# Patient Record
Sex: MALE | NOT HISPANIC OR LATINO | Employment: UNEMPLOYED | URBAN - METROPOLITAN AREA
[De-identification: names, ages, dates, MRNs, and addresses within clinical notes are randomized per-mention and may not be internally consistent; named-entity substitution may affect disease eponyms.]

---

## 2022-09-26 ENCOUNTER — APPOINTMENT (OUTPATIENT)
Dept: CT IMAGING | Facility: HOSPITAL | Age: 22
End: 2022-09-26
Attending: EMERGENCY MEDICINE

## 2022-09-26 ENCOUNTER — HOSPITAL ENCOUNTER (EMERGENCY)
Facility: HOSPITAL | Age: 22
Discharge: HOME | End: 2022-09-26
Attending: STUDENT IN AN ORGANIZED HEALTH CARE EDUCATION/TRAINING PROGRAM | Admitting: STUDENT IN AN ORGANIZED HEALTH CARE EDUCATION/TRAINING PROGRAM

## 2022-09-26 VITALS
HEART RATE: 109 BPM | RESPIRATION RATE: 18 BRPM | SYSTOLIC BLOOD PRESSURE: 136 MMHG | TEMPERATURE: 98.6 F | OXYGEN SATURATION: 97 % | WEIGHT: 160 LBS | DIASTOLIC BLOOD PRESSURE: 77 MMHG

## 2022-09-26 DIAGNOSIS — J02.0 STREPTOCOCCAL PHARYNGITIS: ICD-10-CM

## 2022-09-26 LAB
ANION GAP SERPL CALCULATED.3IONS-SCNC: 14 MMOL/L (ref 7–15)
BUN SERPL-MCNC: 11.2 MG/DL (ref 6–20)
CALCIUM SERPL-MCNC: 9.6 MG/DL (ref 8.6–10)
CHLORIDE SERPL-SCNC: 99 MMOL/L (ref 98–107)
CREAT SERPL-MCNC: 0.59 MG/DL (ref 0.67–1.17)
DEPRECATED HCO3 PLAS-SCNC: 24 MMOL/L (ref 22–29)
DEPRECATED S PYO AG THROAT QL EIA: POSITIVE
ERYTHROCYTE [DISTWIDTH] IN BLOOD BY AUTOMATED COUNT: 12.7 % (ref 10–15)
FLUAV RNA SPEC QL NAA+PROBE: NEGATIVE
FLUBV RNA RESP QL NAA+PROBE: NEGATIVE
GFR SERPL CREATININE-BSD FRML MDRD: >90 ML/MIN/1.73M2
GLUCOSE SERPL-MCNC: 127 MG/DL (ref 70–99)
HCT VFR BLD AUTO: 52.6 % (ref 40–53)
HGB BLD-MCNC: 17.1 G/DL (ref 13.3–17.7)
MCH RBC QN AUTO: 25.8 PG (ref 26.5–33)
MCHC RBC AUTO-ENTMCNC: 32.5 G/DL (ref 31.5–36.5)
MCV RBC AUTO: 79 FL (ref 78–100)
MONOCYTES NFR BLD AUTO: NEGATIVE %
PLATELET # BLD AUTO: 200 10E3/UL (ref 150–450)
POTASSIUM SERPL-SCNC: 4.4 MMOL/L (ref 3.4–5.3)
RBC # BLD AUTO: 6.64 10E6/UL (ref 4.4–5.9)
RSV RNA SPEC NAA+PROBE: NEGATIVE
SARS-COV-2 RNA RESP QL NAA+PROBE: NEGATIVE
SODIUM SERPL-SCNC: 137 MMOL/L (ref 136–145)
WBC # BLD AUTO: 14.1 10E3/UL (ref 4–11)

## 2022-09-26 PROCEDURE — 85027 COMPLETE CBC AUTOMATED: CPT | Performed by: EMERGENCY MEDICINE

## 2022-09-26 PROCEDURE — 258N000003 HC RX IP 258 OP 636: Performed by: EMERGENCY MEDICINE

## 2022-09-26 PROCEDURE — 87637 SARSCOV2&INF A&B&RSV AMP PRB: CPT | Performed by: EMERGENCY MEDICINE

## 2022-09-26 PROCEDURE — 96374 THER/PROPH/DIAG INJ IV PUSH: CPT | Mod: 59

## 2022-09-26 PROCEDURE — 80048 BASIC METABOLIC PNL TOTAL CA: CPT | Performed by: EMERGENCY MEDICINE

## 2022-09-26 PROCEDURE — 86308 HETEROPHILE ANTIBODY SCREEN: CPT | Performed by: EMERGENCY MEDICINE

## 2022-09-26 PROCEDURE — 250N000013 HC RX MED GY IP 250 OP 250 PS 637: Performed by: EMERGENCY MEDICINE

## 2022-09-26 PROCEDURE — 99285 EMERGENCY DEPT VISIT HI MDM: CPT | Mod: 25

## 2022-09-26 PROCEDURE — 96361 HYDRATE IV INFUSION ADD-ON: CPT

## 2022-09-26 PROCEDURE — C9803 HOPD COVID-19 SPEC COLLECT: HCPCS

## 2022-09-26 PROCEDURE — 87880 STREP A ASSAY W/OPTIC: CPT | Performed by: EMERGENCY MEDICINE

## 2022-09-26 PROCEDURE — 250N000011 HC RX IP 250 OP 636: Performed by: EMERGENCY MEDICINE

## 2022-09-26 PROCEDURE — 36415 COLL VENOUS BLD VENIPUNCTURE: CPT | Performed by: EMERGENCY MEDICINE

## 2022-09-26 PROCEDURE — 70491 CT SOFT TISSUE NECK W/DYE: CPT

## 2022-09-26 RX ORDER — PREDNISONE 20 MG/1
TABLET ORAL
Qty: 8 TABLET | Refills: 0 | Status: SHIPPED | OUTPATIENT
Start: 2022-09-27

## 2022-09-26 RX ORDER — IBUPROFEN 200 MG
400 TABLET ORAL ONCE
Status: COMPLETED | OUTPATIENT
Start: 2022-09-26 | End: 2022-09-26

## 2022-09-26 RX ORDER — DEXAMETHASONE SODIUM PHOSPHATE 10 MG/ML
10 INJECTION, SOLUTION INTRAMUSCULAR; INTRAVENOUS ONCE
Status: COMPLETED | OUTPATIENT
Start: 2022-09-26 | End: 2022-09-26

## 2022-09-26 RX ORDER — OXYCODONE HYDROCHLORIDE 5 MG/1
5 TABLET ORAL ONCE
Status: COMPLETED | OUTPATIENT
Start: 2022-09-26 | End: 2022-09-26

## 2022-09-26 RX ORDER — IOPAMIDOL 755 MG/ML
100 INJECTION, SOLUTION INTRAVASCULAR ONCE
Status: COMPLETED | OUTPATIENT
Start: 2022-09-26 | End: 2022-09-26

## 2022-09-26 RX ADMIN — SODIUM CHLORIDE 1000 ML: 9 INJECTION, SOLUTION INTRAVENOUS at 14:24

## 2022-09-26 RX ADMIN — DEXAMETHASONE SODIUM PHOSPHATE 10 MG: 10 INJECTION INTRAMUSCULAR; INTRAVENOUS at 14:32

## 2022-09-26 RX ADMIN — IBUPROFEN 400 MG: 200 TABLET, FILM COATED ORAL at 14:31

## 2022-09-26 RX ADMIN — OXYCODONE HYDROCHLORIDE 5 MG: 5 TABLET ORAL at 14:31

## 2022-09-26 RX ADMIN — IOPAMIDOL 100 ML: 755 INJECTION, SOLUTION INTRAVENOUS at 17:43

## 2022-09-26 ASSESSMENT — ENCOUNTER SYMPTOMS
DIAPHORESIS: 1
NAUSEA: 0
ABDOMINAL PAIN: 1
VOMITING: 0
SORE THROAT: 1
FEVER: 0
COUGH: 1

## 2022-09-26 NOTE — ED NOTES
ED Provider In Triage Note  [facility]  Encounter Date: Sep 26, 2022    Chief Complaint   Patient presents with     Pharyngitis       Brief HPI:   Jayden Whiting is a 22 year old male presenting to the Emergency Department with a chief complaint of ST, can't swallow, muffled voice, nasal congestion. Denies HIV. States vaccines UTD.     Brief Physical Exam:  BP (!) 156/90   Pulse (!) 124   Temp 98.6  F (37  C) (Temporal)   Resp 19   Wt 72.6 kg (160 lb)   SpO2 99%   General: Non-toxic appearing  HEENT: beefy red pharngyx with petechiae. No stridor. No drooling. No trismus. Muffled voice. 2+ submandibular LAD  Resp: No respiratory distress  Abdomen: Non-peritoneal  Neuro: Alert, oriented, answers questions appropriately  Psych: Behavior appropriate  Skin: no rash to hands      Plan Initiated in Triage:  Orders Placed This Encounter     Soft tissue neck CT w contrast     Symptomatic; Unknown Influenza A/B & SARS-CoV2 (COVID-19) Virus PCR Multiplex     Mononucleosis screen     CBC (+ platelets, no diff)     Basic metabolic panel     DISCONTD: dexamethasone (DECADRON) tablet 10 mg     oxyCODONE (ROXICODONE) tablet 5 mg     ibuprofen (ADVIL/MOTRIN) tablet 400 mg     0.9% sodium chloride BOLUS     dexamethasone PF (DECADRON) injection 10 mg       PIT Dispo:   Return to lobby while awaiting workup and ED bed availability    Gelacio Padron MD on 9/26/2022 at 2:18 PM    Patient was evaluated by the Physician in Triage due to a limitation of available rooms in the Emergency Department. A plan of care was discussed based on the information obtained on the initial evaluation and patient was consuled to return back to the Emergency Department lobby after this initial evalutaiton until results were obtained or a room became available in the Emergency Department. Patient was counseled not to leave prior to receiving the results of their workup.     Gelacio Padron MD  [facility]  [facility] EMERGENCY DEPARTMENT  [address] [place] [id number]  [phone]     Gelacio Padron MD  09/26/22 3062

## 2022-09-26 NOTE — ED PROVIDER NOTES
NAME: Jayden Whiting  AGE: 22 year old male  YOB: 2000  MRN: [id number]  EVALUATION DATE & TIME: No admission date for patient encounter.    PCP: System, Provider Not In  ED PROVIDER: Courtney Merchant MD.    Chief Complaint   Patient presents with     Pharyngitis     FINAL IMPRESSION:  1. Streptococcal pharyngitis        MEDICAL DECISION MAKIN:32 PM I met with the patient, obtained history, performed an initial exam, and discussed options and plan for diagnostics and treatment here in the ED.   6:40 PM Rechecked and updated the patient. We discussed the plan for discharge and the patient is agreeable. Reviewed supportive cares, symptomatic treatment, outpatient follow up, and reasons to return to the Emergency Department. Patient to be discharged by ED RN.     MDM: 22-year-old male who presents with sore throat.  His work-up was started in triage.  He was initially tachycardic but heart rate improved/downtrending during his ED visit, he is otherwise nontoxic-appearing.  He does have a very slight muffled voice, no trismus, no difficulty breathing and is handling his secretions without issues. Dx included strep, PTA, epiglottis, lenin's, retropharyngeal abscess, among others. A CT scan was obtained in triage which showed diffuse enlargement and striated hyperenhancement of the pharyngeal and palatine tonsil, no drainable fluid collection seen, also has several enlarged cervical lymph nodes.  He has leukocytosis at 14.1.  BMP is reassuring.  Strep test did return positive.  He was given Toradol, oxycodone and Decadron.  He is feeling much better.  Plan for discharge with antibiotics and steroids.  Recommended close outpatient follow-up.  Strict return precautions discussed and patient is in agreement with plan, endorses understanding and their questions were answered.    MEDICATIONS GIVEN IN THE EMERGENCY:  Medications   oxyCODONE (ROXICODONE) tablet 5 mg (5 mg Oral Given 22 4961)   ibuprofen  (ADVIL/MOTRIN) tablet 400 mg (400 mg Oral Given 9/26/22 1431)   0.9% sodium chloride BOLUS (0 mLs Intravenous Stopped 9/26/22 1529)   dexamethasone PF (DECADRON) injection 10 mg (10 mg Intravenous Given 9/26/22 1432)   iopamidol (ISOVUE-370) solution 100 mL (100 mLs Intravenous Given 9/26/22 1743)       NEW PRESCRIPTIONS STARTED AT TODAY'S ER VISIT:  Discharge Medication List as of 9/26/2022  7:16 PM      START taking these medications    Details   amoxicillin-clavulanate (AUGMENTIN) 875-125 MG tablet Take 1 tablet by mouth 2 times daily for 10 days, Disp-20 tablet, R-0, Local Print      predniSONE (DELTASONE) 20 MG tablet Take two tablets (= 40mg) each day for 4 (four) days, Disp-8 tablet, R-0, Local Print              =================================================================  HPI    Patient information was obtained from: patient  Use of : N/A      Jayden Whiting is a 22 year old male with no past medical history, who presents pharyngitis.    Patient reports 3 tobias of sore throat that worsened yesterday into today. He took tylenol yesterday with no relief. He states the last time he ate was yesterday due to the sore throat. He also reports congestion, sneezing, diaphoresis, cough, mild abdominal pain in center, and epistaxis when he sneezed. He denies fever, vomiting, diarrhea, chest pain, and nausea.    He is a relatively healthy individual and doesn't take routine medications. He has no allergies. He recently traveled to [place].      REVIEW OF SYSTEMS   Review of Systems   Constitutional: Positive for diaphoresis. Negative for fever.   HENT: Positive for congestion, nosebleeds and sore throat.    Respiratory: Positive for cough.    Cardiovascular: Negative for chest pain.   Gastrointestinal: Positive for abdominal pain. Negative for nausea and vomiting. Anal bleeding: mild in center.   All other systems reviewed and are negative.       PAST MEDICAL HISTORY:  No past medical history on  file.    PAST SURGICAL HISTORY:  No past surgical history on file.    CURRENT MEDICATIONS:    No current facility-administered medications for this encounter.    Current Outpatient Medications:      amoxicillin-clavulanate (AUGMENTIN) 875-125 MG tablet, Take 1 tablet by mouth 2 times daily for 10 days, Disp: 20 tablet, Rfl: 0     [START ON 9/27/2022] predniSONE (DELTASONE) 20 MG tablet, Take two tablets (= 40mg) each day for 4 (four) days, Disp: 8 tablet, Rfl: 0    ALLERGIES:  No Known Allergies    FAMILY HISTORY:  No family history on file.    SOCIAL HISTORY:   Social History     Socioeconomic History     Marital status:        PHYSICAL EXAM:    Vitals: /77   Pulse 109   Temp 98.6  F (37  C) (Temporal)   Resp 18   Wt 72.6 kg (160 lb)   SpO2 97%    Constitutional: Well developed, well nourished. No acute distress.  HENT: Normocephalic, atraumatic, mucous membranes moist, nose normal. Neck- Supple, some LAD, gross ROM intact.  Mouth: neck supple without tenderness,, posteriororopharynx with erythema, some petechiae and swelling, no exudates, uvula is midline. Slightly muffled voice. No trismus. Floor of mouth is soft.  Eyes: Pupils mid-range, sclera white, no discharge  Respiratory: Clear to auscultation bilaterally, no respiratory distress, no wheezing, speaks full sentences easily.  Cardiovascular: Regular rhythm, no murmurs. No lower extremity edema. Tachycardiac.  GI: Soft, not distended, minimal epigastric tenderness, no rebound or guarding  Skin: Warm, dry, no rash.  Neurologic: Alert & oriented x 3, speech clear, moving all extremities spontaneously   Psychiatric: Affect normal, cooperative.     LAB:  All pertinent labs reviewed and interpreted.  Labs Ordered and Resulted from Time of ED Arrival to Time of ED Departure   CBC WITH PLATELETS - Abnormal       Result Value    WBC Count 14.1 (*)     RBC Count 6.64 (*)     Hemoglobin 17.1      Hematocrit 52.6      MCV 79      MCH 25.8 (*)     MCHC  32.5      RDW 12.7      Platelet Count 200     BASIC METABOLIC PANEL - Abnormal    Sodium 137      Potassium 4.4      Chloride 99      Carbon Dioxide (CO2) 24      Anion Gap 14      Urea Nitrogen 11.2      Creatinine 0.59 (*)     Calcium 9.6      Glucose 127 (*)     GFR Estimate >90     STREPTOCOCCUS A RAPID SCREEN W REFELX TO PCR - Abnormal    Group A Strep antigen Positive (*)    INFLUENZA A/B & SARS-COV2 PCR MULTIPLEX - Normal    Influenza A PCR Negative      Influenza B PCR Negative      RSV PCR Negative      SARS CoV2 PCR Negative     MONONUCLEOSIS SCREEN - Normal    Mononucleosis Screen Negative         RADIOLOGY:  Soft tissue neck CT w contrast   Final Result   IMPRESSION:    1.  Diffuse enlargement and striated hyperenhancement of the pharyngeal and palatine tonsil. The findings raise concern for tonsillitis/pharyngitis in the appropriate clinical context. Recommend clinical correlation. No drainable    intratonsillar/peritonsillar fluid collection is identified.   2.  Multiple enlarged and hyperenhancing bilateral cervical lymph nodes, presumably reactive.          EKG:   N/A    PROCEDURES:   Procedures     I, Roxie Diop, am serving as a scribe to document services personally performed by Dr. Courtney Merchant based on my observation and the provider's statements to me. I, Courtney Merchant MD attest that Roxie Diop is acting in a scribe capacity, has observed my performance of the services and has documented them in accordance with my direction.    Courtney Merchant M.D.  Emergency Medicine  [facility] EMERGENCY DEPARTMENT  [facility] [facility] [id number]  [phone]  Dept: [phone]     Courtney Merchant MD  09/27/22 3562

## 2022-09-26 NOTE — ED TRIAGE NOTES
Patient reports symptoms x 2 days.  Reports sore throat, difficulty swallowing, headache, and lethargy.  Has had COVID shots x 2.

## 2022-09-26 NOTE — DISCHARGE INSTRUCTIONS
Please take the antibiotics and steroid as prescribed.  I want you to follow-up with ENT closely in clinic, if you are unable to get with him please follow-up with your primary care doctor.     You can take 1000 mg of tylenol every 6 hours (no more than 4000 mg in 24 hours).  You can take 600 mg of ibuprofen with food every 6-8 hours (no more than 3200 mg in 24 hours).   If needed you can alternate between the two (take tylenol, then 3 hours later take a dose of ibuprofen, then 3 hours later take a dose of tylenol,etc.)    Return to the emergency department if you are unable to keep down food or fluids, have difficulty breathing or other worsening symptoms or concerns